# Patient Record
Sex: MALE | Race: WHITE
[De-identification: names, ages, dates, MRNs, and addresses within clinical notes are randomized per-mention and may not be internally consistent; named-entity substitution may affect disease eponyms.]

---

## 2019-03-25 ENCOUNTER — HOSPITAL ENCOUNTER (EMERGENCY)
Dept: HOSPITAL 26 - MED | Age: 49
Discharge: HOME | End: 2019-03-25
Payer: MEDICAID

## 2019-03-25 VITALS — SYSTOLIC BLOOD PRESSURE: 144 MMHG | DIASTOLIC BLOOD PRESSURE: 68 MMHG

## 2019-03-25 VITALS — BODY MASS INDEX: 26.66 KG/M2 | WEIGHT: 180 LBS | HEIGHT: 69 IN

## 2019-03-25 DIAGNOSIS — E11.9: ICD-10-CM

## 2019-03-25 DIAGNOSIS — K59.00: ICD-10-CM

## 2019-03-25 DIAGNOSIS — H66.92: Primary | ICD-10-CM

## 2019-03-25 DIAGNOSIS — R19.7: ICD-10-CM

## 2019-03-25 PROCEDURE — 74018 RADEX ABDOMEN 1 VIEW: CPT

## 2019-03-25 PROCEDURE — 82948 REAGENT STRIP/BLOOD GLUCOSE: CPT

## 2019-03-25 PROCEDURE — 99283 EMERGENCY DEPT VISIT LOW MDM: CPT

## 2019-03-25 NOTE — NUR
Patient discharged with v/s stable. Written and verbal after care instructions 
given and explained. 

Patient alert, oriented and verbalized understanding of instructions. 
Ambulatory with steady gait. All questions addressed prior to discharge. ID 
band removed. Patient advised to follow up with PMD. Rx of MIRALAX AND 
AMOXICILLIN given. Patient educated on indication of medication including 
possible reaction and side effects. Opportunity to ask questions provided and 
answered.

## 2019-03-25 NOTE — NUR
PT PRESENTS TO ED WITH RIGHT EAR PAIN X3 DAYS. NO DIZZINESS, NO ALOC, NO N/V. 
8/10 PAIN. NO DRAINAGE. EDEMA AND REDNESS TO CANAL. TYMPANIC MEMBRANE CONCAVE 
AND REFLECTIVE. INCREASED CERUMEN BUILDUP. A&OX4. VSS. ER MD AWARE. POSITIONED 
IN BED FOR COMFORT WITH SIDE RAIL UP. CONTINUE TO MONITOR.